# Patient Record
Sex: FEMALE | Race: WHITE | NOT HISPANIC OR LATINO | Employment: OTHER | URBAN - METROPOLITAN AREA
[De-identification: names, ages, dates, MRNs, and addresses within clinical notes are randomized per-mention and may not be internally consistent; named-entity substitution may affect disease eponyms.]

---

## 2017-10-03 ENCOUNTER — APPOINTMENT (OUTPATIENT)
Dept: RADIOLOGY | Facility: MEDICAL CENTER | Age: 82
End: 2017-10-03
Payer: COMMERCIAL

## 2017-10-03 ENCOUNTER — TRANSCRIBE ORDERS (OUTPATIENT)
Dept: ADMINISTRATIVE | Facility: HOSPITAL | Age: 82
End: 2017-10-03

## 2017-10-03 DIAGNOSIS — R07.9 CHEST PAIN, UNSPECIFIED: Primary | ICD-10-CM

## 2017-10-03 DIAGNOSIS — N63.0 BREAST LUMP: ICD-10-CM

## 2017-10-03 DIAGNOSIS — R07.9 CHEST PAIN, UNSPECIFIED: ICD-10-CM

## 2017-10-03 PROCEDURE — 71101 X-RAY EXAM UNILAT RIBS/CHEST: CPT

## 2017-10-05 ENCOUNTER — HOSPITAL ENCOUNTER (OUTPATIENT)
Dept: ULTRASOUND IMAGING | Facility: CLINIC | Age: 82
Discharge: HOME | End: 2017-10-05
Payer: COMMERCIAL

## 2017-10-05 ENCOUNTER — HOSPITAL ENCOUNTER (OUTPATIENT)
Dept: MAMMOGRAPHY | Facility: CLINIC | Age: 82
Discharge: HOME | End: 2017-10-05
Payer: COMMERCIAL

## 2017-10-05 DIAGNOSIS — N63.0 BREAST LUMP: ICD-10-CM

## 2017-10-05 PROCEDURE — 76642 ULTRASOUND BREAST LIMITED: CPT

## 2017-10-05 PROCEDURE — G0279 TOMOSYNTHESIS, MAMMO: HCPCS

## 2017-10-05 PROCEDURE — G0206 DX MAMMO INCL CAD UNI: HCPCS

## 2018-01-08 ENCOUNTER — TRANSCRIBE ORDERS (OUTPATIENT)
Dept: ADMINISTRATIVE | Facility: HOSPITAL | Age: 83
End: 2018-01-08

## 2018-01-08 DIAGNOSIS — Z12.31 VISIT FOR SCREENING MAMMOGRAM: Primary | ICD-10-CM

## 2018-02-15 ENCOUNTER — HOSPITAL ENCOUNTER (OUTPATIENT)
Dept: MAMMOGRAPHY | Facility: MEDICAL CENTER | Age: 83
Discharge: HOME | End: 2018-02-15
Payer: COMMERCIAL

## 2018-02-15 DIAGNOSIS — Z12.31 VISIT FOR SCREENING MAMMOGRAM: ICD-10-CM

## 2018-02-15 PROCEDURE — 77067 SCR MAMMO BI INCL CAD: CPT

## 2018-02-15 PROCEDURE — 77063 BREAST TOMOSYNTHESIS BI: CPT

## 2024-10-19 ENCOUNTER — OFFICE VISIT (OUTPATIENT)
Dept: URGENT CARE | Facility: CLINIC | Age: 89
End: 2024-10-19
Payer: COMMERCIAL

## 2024-10-19 VITALS
SYSTOLIC BLOOD PRESSURE: 142 MMHG | HEART RATE: 99 BPM | OXYGEN SATURATION: 96 % | DIASTOLIC BLOOD PRESSURE: 88 MMHG | RESPIRATION RATE: 23 BRPM | TEMPERATURE: 99.2 F

## 2024-10-19 DIAGNOSIS — R04.2 HEMOPTYSIS: Primary | ICD-10-CM

## 2024-10-19 PROCEDURE — 99203 OFFICE O/P NEW LOW 30 MIN: CPT | Performed by: PREVENTIVE MEDICINE

## 2024-10-19 RX ORDER — LOSARTAN POTASSIUM 25 MG/1
TABLET ORAL
COMMUNITY

## 2024-10-19 RX ORDER — AMLODIPINE BESYLATE 5 MG/1
TABLET ORAL
COMMUNITY

## 2024-10-19 NOTE — PROGRESS NOTES
[facility]        NAME: Tracie Phan is a 89 y.o. female  : 1927    MRN: [id number]  DATE: 2024  TIME: 1:58 PM    Assessment and Plan   Hemoptysis [R04.2]  1. Hemoptysis              Patient Instructions       Follow up with PCP in 3-5 days.  Proceed to  ER if symptoms worsen.    If tests have been performed at [facility], our office will contact you with results if changes need to be made to the care plan discussed with you at the visit.  You can review your full results on [facility].    Chief Complaint     Chief Complaint   Patient presents with    Coughing Up Blood     Coughing up blood, weakness, headache, congestion. S/s started last night. Blood is dark colored. Pt notes the blood is happening when she is lying flat. Pts hematemesis is now lighter in color this morning with phlegm. Pt notes some SOB.          History of Present Illness       By history last night when she lied down she began coughing and coughed up blood.  She said it was a significant amount more than just a pink color in the tissues.  This morning when she stood up and walked around the house the coughing stopped and resumed no more hemoptysis or pink blood.  She feels well, no fever, no other symptoms other than headache which she attributes to not sleeping well.        Review of Systems   Review of Systems   Respiratory:          Hemoptysis         Current Medications       Current Outpatient Medications:     Acetaminophen (Tylenol) 325 MG CAPS, Take by oral route., Disp: , Rfl:     amLODIPine (NORVASC) 5 mg tablet, Take by mouth, Disp: , Rfl:     losartan (COZAAR) 25 mg tablet, Take by mouth, Disp: , Rfl:     metoprolol succinate (TOPROL-XL) 25 mg 24 hr tablet, , Disp: , Rfl:     oxybutynin (DITROPAN-XL) 10 MG 24 hr tablet, Take 10 mg by mouth daily, Disp: , Rfl:     SYNTHROID 75 MCG tablet, , Disp: , Rfl:     XARELTO 20 MG tablet, , Disp: , Rfl:     Current Allergies     Allergies as of  10/19/2024    (No Known Allergies)            The following portions of the patient's history were reviewed and updated as appropriate: allergies, current medications, past family history, past medical history, past social history, past surgical history and problem list.     Past Medical History:   Diagnosis Date    Disease of thyroid gland     GERD (gastroesophageal reflux disease)     HL (hearing loss)     Hypertension     Stroke (HCC)        Past Surgical History:   Procedure Laterality Date    ADENOIDECTOMY      ANKLE SURGERY      CATARACT EXTRACTION      GALLBLADDER SURGERY      MAMMO NEEDLE LOCALIZATION LEFT (ALL INC) Left 10/17/2012    TONSILLECTOMY         History reviewed. No pertinent family history.      Medications have been verified.        Objective   /88   Pulse 99   Temp 99.2 °F (37.3 °C) (Tympanic)   Resp (!) 23   SpO2 96%   No LMP recorded. Patient is postmenopausal.       Physical Exam     Physical Exam  Constitutional:       General: She is not in acute distress.     Appearance: Normal appearance. She is not ill-appearing, toxic-appearing or diaphoretic.   Cardiovascular:      Heart sounds: Normal heart sounds.   Pulmonary:      Effort: No respiratory distress.      Breath sounds: Normal breath sounds. No wheezing, rhonchi or rales.   Neurological:      Mental Status: She is alert.

## 2024-10-19 NOTE — PATIENT INSTRUCTIONS
It is possible that you broke a small blood vessel in the trachea or bronchi from coughing.  If you begin coughing up blood when you get home or tonight I want you to go to the emergency room.

## 2025-05-18 ENCOUNTER — OFFICE VISIT (OUTPATIENT)
Dept: URGENT CARE | Facility: CLINIC | Age: OVER 89
End: 2025-05-18
Payer: COMMERCIAL

## 2025-05-18 VITALS
HEART RATE: 71 BPM | TEMPERATURE: 97.4 F | DIASTOLIC BLOOD PRESSURE: 86 MMHG | SYSTOLIC BLOOD PRESSURE: 152 MMHG | RESPIRATION RATE: 18 BRPM | OXYGEN SATURATION: 98 %

## 2025-05-18 DIAGNOSIS — H53.8 BLURRED VISION, LEFT EYE: Primary | ICD-10-CM

## 2025-05-18 DIAGNOSIS — Z86.73 HISTORY OF STROKE: ICD-10-CM

## 2025-05-18 PROCEDURE — 99214 OFFICE O/P EST MOD 30 MIN: CPT | Performed by: PHYSICIAN ASSISTANT

## 2025-05-18 NOTE — PROGRESS NOTES
[facility]        NAME: Tracie Phan is a 89 y.o. female  : 1927    MRN: [id number]  DATE: May 18, 2025  TIME: 1:33 PM    Assessment and Plan   Blurred vision, left eye [H53.8]  1. Blurred vision, left eye  Transfer to other facility      2. History of stroke  Transfer to other facility            Patient Instructions     Patient had onset last night of peripheral blurred vision in her left eye.  She has history of A-fib on anticoagulants with prior history of stroke/TIA.  Given this, I recommended she proceed to the ER for further workup and she was agreeable to be taken to [facility] emergency department via private vehicle.  Follow up with PCP in 3-5 days.  Proceed to  ER if symptoms worsen.    If tests have been performed at [facility], our office will contact you with results if changes need to be made to the care plan discussed with you at the visit.  [facility].    Chief Complaint     Chief Complaint   Patient presents with    Eye Problem     Left eye blurryness started last night. Supposed to be getting blood drawn ordered from the family doctor. This morning now is having intermittent headache above the left eye. Thinks it could be stress.          History of Present Illness       Patient presents with what she reports is blurred vision in the peripheral visual field of her left eye which began last night.  She reports it has persisted and she also has pressure/headache over her left frontal region.  She denies eye crusting or discharge, redness, photophobia.  She denies any rash over the area of distribution of her headache.  She denies facial droop, difficulty speaking or swallowing, dizziness or syncope.  She denies any numbness or paresthesias of her face or extremities.  She has history of A-fib on Xarelto and has had prior history of stroke/TIA.  She otherwise feels well.        Review of Systems   Review of  Systems   Constitutional: Negative.    Eyes:  Positive for visual disturbance (Left eye peripheral visual field blurry). Negative for photophobia, pain, discharge, redness and itching.   Respiratory: Negative.     Cardiovascular: Negative.    Gastrointestinal: Negative.    Genitourinary: Negative.    Skin: Negative.    Neurological:  Positive for headaches. Negative for dizziness, syncope, facial asymmetry, speech difficulty, light-headedness and numbness.         Current Medications     Current Medications[1]    Current Allergies     Allergies as of 05/18/2025    (No Known Allergies)            The following portions of the patient's history were reviewed and updated as appropriate: allergies, current medications, past family history, past medical history, past social history, past surgical history and problem list.     Past Medical History:   Diagnosis Date    Disease of thyroid gland     GERD (gastroesophageal reflux disease)     HL (hearing loss)     Hypertension     Stroke (HCC)        Past Surgical History:   Procedure Laterality Date    ADENOIDECTOMY      ANKLE SURGERY      CATARACT EXTRACTION      GALLBLADDER SURGERY      MAMMO NEEDLE LOCALIZATION LEFT (ALL INC) Left 10/17/2012    TONSILLECTOMY         History reviewed. No pertinent family history.      Medications have been verified.        Objective   /86   Pulse 71   Temp (!) 97.4 °F (36.3 °C) (Tympanic)   Resp 18   SpO2 98%   No LMP recorded. Patient is postmenopausal.       Physical Exam     Physical Exam  Vitals reviewed.   Constitutional:       General: She is not in acute distress.     Appearance: She is well-developed.   HENT:      Right Ear: Tympanic membrane, ear canal and external ear normal.      Left Ear: Tympanic membrane, ear canal and external ear normal.      Mouth/Throat:      Mouth: Mucous membranes are moist.      Pharynx: Oropharynx is clear.     Eyes:      General:         Right eye: No discharge.         Left eye: No  discharge.      Extraocular Movements: Extraocular movements intact.      Conjunctiva/sclera: Conjunctivae normal.      Pupils: Pupils are equal, round, and reactive to light.      Comments: No nystagmus or photophobia   Neck:      Vascular: No carotid bruit.     Cardiovascular:      Rate and Rhythm: Normal rate and regular rhythm.      Pulses: Normal pulses.      Heart sounds: Normal heart sounds. No murmur heard.  Pulmonary:      Effort: Pulmonary effort is normal. No respiratory distress.      Breath sounds: Normal breath sounds.     Musculoskeletal:      Cervical back: Neck supple.      Right lower leg: No edema.      Left lower leg: No edema.   Lymphadenopathy:      Cervical: No cervical adenopathy.     Skin:     Findings: No rash.     Neurological:      General: No focal deficit present.      Mental Status: She is alert and oriented to person, place, and time.      Cranial Nerves: No cranial nerve deficit.      Sensory: No sensory deficit.      Motor: No weakness.      Coordination: Coordination normal.      Gait: Gait normal.                        [1]   Current Outpatient Medications:     Acetaminophen (Tylenol) 325 MG CAPS, Take by oral route., Disp: , Rfl:     amLODIPine (NORVASC) 5 mg tablet, Take by mouth, Disp: , Rfl:     losartan (COZAAR) 25 mg tablet, Take by mouth, Disp: , Rfl:     metoprolol succinate (TOPROL-XL) 25 mg 24 hr tablet, , Disp: , Rfl:     oxybutynin (DITROPAN-XL) 10 MG 24 hr tablet, Take 10 mg by mouth daily, Disp: , Rfl:     SYNTHROID 75 MCG tablet, , Disp: , Rfl:     XARELTO 20 MG tablet, , Disp: , Rfl: